# Patient Record
Sex: MALE | Race: WHITE | Employment: FULL TIME | URBAN - METROPOLITAN AREA
[De-identification: names, ages, dates, MRNs, and addresses within clinical notes are randomized per-mention and may not be internally consistent; named-entity substitution may affect disease eponyms.]

---

## 2017-12-08 ENCOUNTER — OFFICE VISIT (OUTPATIENT)
Dept: NEUROLOGY | Age: 67
End: 2017-12-08

## 2017-12-08 VITALS
SYSTOLIC BLOOD PRESSURE: 120 MMHG | RESPIRATION RATE: 12 BRPM | TEMPERATURE: 98.8 F | HEART RATE: 76 BPM | WEIGHT: 165.7 LBS | HEIGHT: 71 IN | BODY MASS INDEX: 23.2 KG/M2 | OXYGEN SATURATION: 98 % | DIASTOLIC BLOOD PRESSURE: 80 MMHG

## 2017-12-08 DIAGNOSIS — R47.89 WORD FINDING DIFFICULTY: ICD-10-CM

## 2017-12-08 DIAGNOSIS — R41.89 COGNITIVE DECLINE: ICD-10-CM

## 2017-12-08 DIAGNOSIS — R41.3 MEMORY LOSS: Primary | ICD-10-CM

## 2017-12-08 DIAGNOSIS — I65.23 BILATERAL CAROTID ARTERY STENOSIS: Primary | ICD-10-CM

## 2017-12-08 DIAGNOSIS — R41.3 MEMORY LOSS: ICD-10-CM

## 2017-12-08 NOTE — MR AVS SNAPSHOT
Visit Information Date & Time Provider Department Dept. Phone Encounter #  
 12/8/2017 11:00 AM MD Radha Watsont [facility] [phone] [id number] Follow-up Instructions Return for After tests. Your Appointments 12/8/2017 11:00 AM  
New Patient with Solomon Costello MD  
[facility]) Appt Note: Memory Loss, Confusion ref dr. alexandrea sloan chloe 11-1-17; r/s, memory loss // 11/17 bw; Memory Loss, Confusion seb 11-20-17  
 [facility] [address] [facility] 99 [id number] [phone]  
  
   
 [place] [facility] [address] [id number] [address] Upcoming Health Maintenance Date Due Hepatitis C Screening 1950 DTaP/Tdap/Td series (1 - Tdap) 7/14/1971 FOBT Q 1 YEAR AGE 50-75 7/14/2000 ZOSTER VACCINE AGE 60> 5/14/2010 GLAUCOMA SCREENING Q2Y 7/14/2015 Pneumococcal 65+ Low/Medium Risk (1 of 2 - PCV13) 7/14/2015 MEDICARE YEARLY EXAM 7/14/2015 Influenza Age 5 to Adult 12/22/2017* *Topic was postponed. The date shown is not the original due date. Allergies as of 12/8/2017  Review Complete On: 12/8/2017 By: Solomon Costello MD  
  
 Severity Noted Reaction Type Reactions Sulfa (Sulfonamide Antibiotics)  01/19/2012   Side Effect Nausea and Vomiting Current Immunizations  Never Reviewed No immunizations on file. Not reviewed this visit You Were Diagnosed With   
  
 Codes Comments Memory loss    -  Primary ICD-10-CM: R41.3 ICD-9-CM: 780.93 Cognitive decline     ICD-10-CM: R41.89 ICD-9-CM: 294.9 Word finding difficulty     ICD-10-CM: R47.89 ICD-9-CM: V40.1 Vitals BP Pulse Temp Resp Height(growth percentile) Weight(growth percentile) 120/80 (BP 1 Location: Left arm, BP Patient Position: Sitting) 76 98.8 °F (37.1 °C) (Oral) 12 5' 11\" (1.803 m) 165 lb 11.2 oz (75.2 kg) SpO2 BMI Smoking Status 98% 23.11 kg/m2 Never Smoker Vitals History BMI and BSA Data Body Mass Index Body Surface Area  
 23.11 kg/m 2 1.94 m 2 Your Updated Medication List  
  
Notice  As of 12/8/2017 10:35 AM  
 You have not been prescribed any medications. We Performed the Following REFERRAL TO NEUROPSYCHOLOGY [DEK93 Custom] Comments:  
 Memory loss dementia vs other T4, FREE [id number] CPT(R)] TSH 3RD GENERATION [88106 CPT(R)] VITAMIN B12 [id number] CPT(R)] Follow-up Instructions Return for After tests. To-Do List   
 12/08/2017 Imaging:  DUPLEX CAROTID BILATERAL AMB NEURO   
  
 12/08/2017 Neurology:  EEG   
  
 12/08/2017 Imaging:  MRI BRAIN WO CONT Referral Information Referral ID Referred By Referred To  
  
 [id number] Swati VALLES [address] [facility] [place] [address] [facility], [address] [facility] Phone: [phone] Fax: [phone] Visits Status Start Date End Date 1 New Request 12/8/17 12/8/18 If your referral has a status of pending review or denied, additional information will be sent to support the outcome of this decision. Patient Instructions Elvis Case [id number] What is a living will? A living will is a legal form you use to write down the kind of care you want at the end of your life. It is used by the health professionals who will treat you if you aren't able to decide for yourself. If you put your wishes in writing, your loved ones and others will know what kind of care you want. They won't need to guess. This can ease your mind and be helpful to others. A living will is not the same as an estate or property will. An estate will explains what you want to happen with your money and property after you die. Is a living will a legal document? A living will is a legal document.  Each state has its own laws about living rodriguez. If you move to another state, make sure that your living will is legal in the state where you now live. Or you might use a universal form that has been approved by many states. This kind of form can sometimes be completed and stored online. Your electronic copy will then be available wherever you have a connection to the Internet. In most cases, doctors will respect your wishes even if you have a form from a different state. · You don't need an  to complete a living will. But legal advice can be helpful if your state's laws are unclear, your health history is complicated, or your family can't agree on what should be in your living will. · You can change your living will at any time. Some people find that their wishes about end-of-life care change as their health changes. · In addition to making a living will, think about completing a medical power of  form. This form lets you name the person you want to make end-of-life treatment decisions for you (your \"health care agent\") if you're not able to. Many hospitals and nursing homes will give you the forms you need to complete a living will and a medical power of . · Your living will is used only if you can't make or communicate decisions for yourself anymore. If you become able to make decisions again, you can accept or refuse any treatment, no matter what you wrote in your living will. · Your state may offer an online registry. This is a place where you can store your living will online so the doctors and nurses who need to treat you can find it right away. What should you think about when creating a living will? Talk about your end-of-life wishes with your family members and your doctor. Let them know what you want. That way the people making decisions for you won't be surprised by your choices. Think about these questions as you make your living will: · Do you know enough about life support methods that might be used? If not, talk to your doctor so you know what might be done if you can't breathe on your own, your heart stops, or you're unable to swallow. · What things would you still want to be able to do after you receive life-support methods? Would you want to be able to walk? To speak? To eat on your own? To live without the help of machines? · If you have a choice, where do you want to be cared for? In your home? At a hospital or nursing home? · Do you want certain Confucianist practices performed if you become very ill? · If you have a choice at the end of your life, where would you prefer to die? At home? In a hospital or nursing home? Somewhere else? · Would you prefer to be buried or cremated? · Do you want your organs to be donated after you die? What should you do with your living will? · Make sure that your family members and your health care agent have copies of your living will. · Give your doctor a copy of your living will to keep in your medical record. If you have more than one doctor, make sure that each one has a copy. · You may want to put a copy of your living will where it can be easily found. Where can you learn more? Go to http://flor-selma.info/. Enter V432 in the search box to learn more about \"Learning About Living Sunday. \" Current as of: September 24, 2016 Content Version: 11.4 © 7211-1750 Inn[employer]. Care instructions adapted under license by [employer] (which disclaims liability or warranty for this information). If you have questions about a medical condition or this instruction, always ask your healthcare professional. [place] [address] any warranty or liability for your use of this information. Information Regarding Testing If you have physican order for a test or a medication denied by your insurance company, this does not mean the test or medication is not appropriate for you as that is a medical decision, not a decision to be made by an insurance company representative or by an [address] [place] [address] who has not interviewed and examined you. This is a decision to be made between you and your physician. The denial of services is a contractual matter between you and your insurance company, not an issue between your physician and the insurance company. If your test or medication is denied, you can take the following steps to help resolve the issue: 1. File a complaint with the [facility] of Insurance regarding your insurance company's denial of services ordered for you. You can do this either by calling them directly or by completing an on-line complaint form on the [employer]. This can be found at [email or web address].[email or web address] 2. Also file a formal complaint with your insurance company and ask to have the name of the person denying the service so that you may explore a legal option should you be harmed by this denial of service. Again, the fact the insurance company will not pay for the service does not mean it is not medically necessary and I would encourage you to follow through with the plan that was made with your physician 3. File a written complaint with your employer so your employer and benefit manager is aware of the poor coverage they are providing their employees. If you have medicare/medicaid, complain to your representative in the House and to your Bouchra Leone. If we have ordered testing for you, we do not call patients with results and we do not give test results over the phone. We schedule follow up appointments so that your results can be discussed in person and any questions you have regarding them may be addressed.   If something of concern is revealed on your test, we will call you for a sooner follow up appointment. Additionally, results may be found by using the My Chart feature and one of our patient service representatives at the  can give you instructions on how to access this feature of our electronic medical record system. PRESCRIPTION REFILL POLICY [facility] Statement to Patients April 1, 2014 In an effort to ensure the large volume of patient prescription refills is processed in the most efficient and expeditious manner, we are asking our patients to assist us by calling your Pharmacy for all prescription refills, this will include also your  Mail Order Pharmacy. The pharmacy will contact our office electronically to continue the refill process. Please do not wait until the last minute to call your pharmacy. We need at least 48 hours (2days) to fill prescriptions. We also encourage you to call your pharmacy before going to  your prescription to make sure it is ready. With regard to controlled substance prescription refill requests (narcotic refills) that need to be picked up at our office, we ask your cooperation by providing us with at least 72 hours (3days) notice that you will need a refill. We will not refill narcotic prescription refill requests after 4:00pm on any weekday, Monday through Thursday, or after 2:00pm on Fridays, or on the weekends. We encourage everyone to explore another way of getting your prescription refill request processed using ReplyBuy, our patient web portal through our electronic medical record system. ReplyBuy is an efficient and effective way to communicate your medication request directly to the office and  downloadable as an rhett on your smart phone . [employer]Buy also features a review functionality that allows you to view your medication list as well as leave messages for your physician. Are you ready to get connected?  If so please review the attatched instructions or speak to any of our staff to get you set up right away! Thank you so much for your cooperation. Should you have any questions please contact our Practice Administrator. The Physicians and Staff,  [facility] Introducing [facility] SERVICES! [facility] introduces Fancy Hands patient portal. Now you can access parts of your medical record, email your doctor's office, and request medication refills online. 1. In your internet browser, go to [email or web address] 2. Click on the First Time User? Click Here link in the Sign In box. You will see the New Member Sign Up page. 3. Enter your Fancy Hands Access Code exactly as it appears below. You will not need to use this code after youve completed the sign-up process. If you do not sign up before the expiration date, you must request a new code. · Fancy Hands Access Code: [email or web address] Expires: 3/8/2018 10:33 AM 
 
4. Enter the last four digits of your Social Security Number (xxxx) and Date of Birth (mm/dd/yyyy) as indicated and click Submit. You will be taken to the next sign-up page. 5. Create a Fancy Hands ID. This will be your Fancy Hands login ID and cannot be changed, so think of one that is secure and easy to remember. 6. Create a Fancy Hands password. You can change your password at any time. 7. Enter your Password Reset Question and Answer. This can be used at a later time if you forget your password. 8. Enter your e-mail address. You will receive e-mail notification when new information is available in [address]. 9. Click Sign Up. You can now view and download portions of your medical record. 10. Click the Download Summary menu link to download a portable copy of your medical information. If you have questions, please visit the Frequently Asked Questions section of the Fancy Hands website. Remember, Fancy Hands is NOT to be used for urgent needs. For medical emergencies, dial 911. Now available from your iPhone and Android! Please provide this summary of care documentation to your next provider. Your primary care clinician is listed as Kathia Cruz. If you have any questions after today's visit, please call [phone].

## 2017-12-08 NOTE — PATIENT INSTRUCTIONS
Learning About Living Sunday  What is a living will? A living will is a legal form you use to write down the kind of care you want at the end of your life. It is used by the health professionals who will treat you if you aren't able to decide for yourself. If you put your wishes in writing, your loved ones and others will know what kind of care you want. They won't need to guess. This can ease your mind and be helpful to others. A living will is not the same as an estate or property will. An estate will explains what you want to happen with your money and property after you die. Is a living will a legal document? A living will is a legal document. Each state has its own laws about living rodriguez. If you move to another state, make sure that your living will is legal in the state where you now live. Or you might use a universal form that has been approved by many states. This kind of form can sometimes be completed and stored online. Your electronic copy will then be available wherever you have a connection to the Internet. In most cases, doctors will respect your wishes even if you have a form from a different state. · You don't need an  to complete a living will. But legal advice can be helpful if your state's laws are unclear, your health history is complicated, or your family can't agree on what should be in your living will. · You can change your living will at any time. Some people find that their wishes about end-of-life care change as their health changes. · In addition to making a living will, think about completing a medical power of  form. This form lets you name the person you want to make end-of-life treatment decisions for you (your \"health care agent\") if you're not able to. Many hospitals and nursing homes will give you the forms you need to complete a living will and a medical power of .   · Your living will is used only if you can't make or communicate decisions for yourself anymore. If you become able to make decisions again, you can accept or refuse any treatment, no matter what you wrote in your living will. · Your state may offer an online registry. This is a place where you can store your living will online so the doctors and nurses who need to treat you can find it right away. What should you think about when creating a living will? Talk about your end-of-life wishes with your family members and your doctor. Let them know what you want. That way the people making decisions for you won't be surprised by your choices. Think about these questions as you make your living will:  · Do you know enough about life support methods that might be used? If not, talk to your doctor so you know what might be done if you can't breathe on your own, your heart stops, or you're unable to swallow. · What things would you still want to be able to do after you receive life-support methods? Would you want to be able to walk? To speak? To eat on your own? To live without the help of machines? · If you have a choice, where do you want to be cared for? In your home? At a hospital or nursing home? · Do you want certain Evangelical practices performed if you become very ill? · If you have a choice at the end of your life, where would you prefer to die? At home? In a hospital or nursing home? Somewhere else? · Would you prefer to be buried or cremated? · Do you want your organs to be donated after you die? What should you do with your living will? · Make sure that your family members and your health care agent have copies of your living will. · Give your doctor a copy of your living will to keep in your medical record. If you have more than one doctor, make sure that each one has a copy. · You may want to put a copy of your living will where it can be easily found. Where can you learn more? Go to http://flor-selma.info/.   Enter D133 in the search box to learn more about \"Learning About Living Sunday. \"  Current as of: September 24, 2016  Content Version: 11.4  © 2891-8535 Healthwise, Incorporated. Care instructions adapted under license by NeuroSigma (which disclaims liability or warranty for this information). If you have questions about a medical condition or this instruction, always ask your healthcare professional. [address] any warranty or liability for your use of this information. Information Regarding Testing     If you have physican order for a test or a medication denied by your insurance company, this does not mean the test or medication is not appropriate for you as that is a medical decision, not a decision to be made by an insurance company representative or by an [facility] physician who has not interviewed and examined you. This is a decision to be made between you and your physician. The denial of services is a contractual matter between you and your insurance company, not an issue between your physician and the insurance company. If your test or medication is denied, you can take the following steps to help resolve the issue:    1. File a complaint with the [facility] of Insurance regarding your insurance company's denial of services ordered for you. You can do this either by calling them directly or by completing an on-line complaint form on the [employer]. This can be found at www.[place].gov    2. Also file a formal complaint with your insurance company and ask to have the name of the person denying the service so that you may explore a legal option should you be harmed by this denial of service. Again, the fact the insurance company will not pay for the service does not mean it is not medically necessary and I would encourage you to follow through with the plan that was made with your physician    3.    File a written complaint with your employer so your employer and benefit manager is aware of the poor coverage they are providing their employees. If you have medicare/medicaid, complain to your representative in the House and to your Bouchra Leone. If we have ordered testing for you, we do not call patients with results and we do not give test results over the phone. We schedule follow up appointments so that your results can be discussed in person and any questions you have regarding them may be addressed. If something of concern is revealed on your test, we will call you for a sooner follow up appointment. Additionally, results may be found by using the My Chart feature and one of our patient service representatives at the  can give you instructions on how to access this feature of our electronic medical record system. [facility]   Statement to Patients  April 1, 2014      In an effort to ensure the large volume of patient prescription refills is processed in the most efficient and expeditious manner, we are asking our patients to assist us by calling your Pharmacy for all prescription refills, this will include also your  Mail Order Pharmacy. The pharmacy will contact our office electronically to continue the refill process. Please do not wait until the last minute to call your pharmacy. We need at least 48 hours (2days) to fill prescriptions. We also encourage you to call your pharmacy before going to  your prescription to make sure it is ready. With regard to controlled substance prescription refill requests (narcotic refills) that need to be picked up at our office, we ask your cooperation by providing us with at least 72 hours (3days) notice that you will need a refill. We will not refill narcotic prescription refill requests after 4:00pm on any weekday, Monday through Thursday, or after 2:00pm on Fridays, or on the weekends.       We encourage everyone to explore another way of getting your prescription refill request processed using Shotlst, our patient web portal through our electronic medical record system. Shotlst is an efficient and effective way to communicate your medication request directly to the office and  downloadable as an rhett on your smart phone . Shotlst also features a review functionality that allows you to view your medication list as well as leave messages for your physician. Are you ready to get connected? If so please review the attatched instructions or speak to any of our staff to get you set up right away! Thank you so much for your cooperation. Should you have any questions please contact our Practice Administrator.     The Physicians and Staff,  [facility]

## 2017-12-08 NOTE — PROGRESS NOTES
Chief Complaint   Patient presents with    Memory Loss     x 2 yrs    Altered mental status     x 2 yrs     1. Have you been to the ER, urgent care clinic since your last visit? Hospitalized since your last visit? Was seen in the [facility]  ER for Acute issues and some testing    2. Have you seen or consulted any other health care providers outside of the [address] [place] [address] since your last visit? Include any pap smears or colon screening. No      Pt states that he got lost a few months back on the [address] due to his memory loss.

## 2017-12-08 NOTE — PROGRESS NOTES
[facility]. [address]   [place] [address]   [facility]    [id number]   [phone] Fax             Referring: Surinder Sol MD      Chief Complaint   Patient presents with    Memory Loss     x 2 yrs    Altered mental status     x 3yrs     71-year-old self-described ambidextrous gentleman who presents today accompanied by his brother, Kathye Kawasaki, for evaluation of forgetfulness and memory loss. He tells me that he is having difficulty finding words. He says that he is in the middle of a conversation and cannot recall the appropriate word to say. He says that he gets stuck on words. This is quite bothersome for him because he notes that he used to be a student of language. He notes that this is been progressive. He thinks he does not remember as well as he used to. 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  He notes that he has had some difficulty at work in terms of completing tasks at times but nothing major. He notes that he typically has everything very regimented and is able to follow that as long as the regimen does not change. Brother notes that there was an incident that was quite bothersome and brought them for second neurologic opinion. He previously saw Dr. Stewart Dalton but never had any evaluation. Patient went to the Social Security office on [address] and this is a part of town where he typically does not go. Patient says that he has not been there in over a year. He says that he became confused all the streets look-alike. He pulled into the fire house because he felt that that would be a good place for him to get in asked some questions and get out. Apparently he was escorted home. His brother took him to the [facility] emergency department to make sure there was nothing acute ongoing.   He had a head CT in the blood work evaluation and all that was fine. Discharge papers that he has with him interestingly has a diagnosis of dementia although he has not had a formal diagnosis or even evaluation for such. He has not had any fever chills. He does not take any medicines so that has not changed. No recent injury or fall. No palpitations shortness of breath. Mother had history of dementia working as a  and having symptoms appear in her late 62s. She had the disease for 12 years before her passing. Past Medical History:   Diagnosis Date    Confusion     GERD (gastroesophageal reflux disease)     Hematochezia 1/19/2012    Memory loss     Psychiatric disorder     depression       Past Surgical History:   Procedure Laterality Date    HX OTHER SURGICAL      Right inguinal hernia repair    CT COLONOSCOPY FLX DX W/COLLJ SPEC WHEN PFRMD  1/19/2012         CT EGD TRANSORAL BIOPSY SINGLE/MULTIPLE  1/19/2012                 Allergies   Allergen Reactions    Sulfa (Sulfonamide Antibiotics) Nausea and Vomiting       Social History   Substance Use Topics    Smoking status: Never Smoker    Smokeless tobacco: Never Used    Alcohol use Yes      Comment: socially       Family History   Problem Relation Age of Onset    Alzheimer Mother        Review of Systems  Pertinent positive and negatives are as noted above otherwise comprehensive systems review is negative    Examination  Visit Vitals    /80 (BP 1 Location: Left arm, BP Patient Position: Sitting)    Pulse 76    Temp 98.8 °F (37.1 °C) (Oral)    Resp 12    Ht 5' 11\" (1.803 m)    Wt 75.2 kg (165 lb 11.2 oz)    SpO2 98%    BMI 23.11 kg/m2     Pleasant, well appearing gentleman who is appropriately dressed and groomed. No icterus. Oropharynx clear. Supple neck without bruit. Heart regular. No murmur. No edema. Neurologically he is awake alert oriented to December 8, 2017.   He says the current president is Carmen Schuster and he does recall [address] [place] [address] Obama but needs a hent to do so. He incorrectly identifies the floor we are on. Correctly calculates a number of quarters in $1.75. Registration 3/3 recall 1/3 and 2/3 with hands. He has difficulty with a cryptic phrase and gets frustrated and a bit agitated at times. Follows commands. He is fluent. No right left confusion. Pupils react bilaterally. Full versions. No nystagmus. Face symmetrical.  Tongue and palate midline. He has no pronation or drift. Age-related paratonia throughout. No abnormal movement. He resists fully in the upper and lower extremities in all muscle groups to direct testing. Symmetric reflexes upper and lower extremities bilaterally. No pathologic reflexes. No ataxia. Gait is steady. Sensory intact to primary. Impression/Plan  27-year-old gentleman with progressive cognitive decline differential diagnosis includes mild cognitive impairment versus age-related cognitive decline versus early stage dementia versus processing issue versus attention issue versus pseudodementia versus other. We will proceed with an MRI of the brain EEG carotid Doppler formal neuropsychological testing and also evaluate metabolic abnormalities with B12 and thyroid functioning. Discussed this today with him at length as well as his brother. Follow-up after testing. This note was created using voice recognition software. Despite editing, there may be syntax errors. This note will not be viewable in [address]

## 2017-12-09 LAB
T4 FREE SERPL-MCNC: 1.1 NG/DL (ref 0.82–1.77)
TSH SERPL DL<=0.005 MIU/L-ACNC: 3.2 UIU/ML (ref 0.45–4.5)
VIT B12 SERPL-MCNC: 510 PG/ML (ref 232–1245)

## 2018-01-09 ENCOUNTER — HOSPITAL ENCOUNTER (OUTPATIENT)
Dept: NEUROLOGY | Age: 68
Discharge: HOME | End: 2018-01-09
Attending: PSYCHIATRY & NEUROLOGY
Payer: COMMERCIAL

## 2018-01-09 ENCOUNTER — HOSPITAL ENCOUNTER (OUTPATIENT)
Dept: MRI IMAGING | Age: 68
Discharge: HOME | End: 2018-01-09
Attending: PSYCHIATRY & NEUROLOGY
Payer: COMMERCIAL

## 2018-01-09 DIAGNOSIS — R47.89 WORD FINDING DIFFICULTY: ICD-10-CM

## 2018-01-09 DIAGNOSIS — R41.89 COGNITIVE DECLINE: ICD-10-CM

## 2018-01-09 DIAGNOSIS — R41.3 MEMORY LOSS: ICD-10-CM

## 2018-01-09 PROCEDURE — 95816 EEG AWAKE AND DROWSY: CPT

## 2018-01-09 PROCEDURE — 70551 MRI BRAIN STEM W/O DYE: CPT

## 2018-01-12 ENCOUNTER — TELEPHONE (OUTPATIENT)
Dept: NEUROLOGY | Age: 68
End: 2018-01-12

## 2018-01-12 NOTE — TELEPHONE ENCOUNTER
----- Message from Santos Wilson sent at 1/12/2018  9:31 AM EST -----  Regarding: Dr. Stallings Primer, brother and power of  would like a call back to confirm whether or not Dr. Alan Stapleton has received the most recent test results. Mr. Derek Araiza can be reached at [phone].

## 2018-01-16 NOTE — TELEPHONE ENCOUNTER
Message left, If we have ordered testing for you, we do not call patients with results and we do not give test results over the phone. We schedule follow up appointments so that your results can be discussed in person and any questions you have regarding them may be addressed. If something of concern is revealed on your test, we will call you for a sooner follow up appointment. Additionally, results may be found by using the My Chart feature and one of our patient service representatives at the  can give you instructions on how to access this feature of our electronic medical record system.

## 2018-01-18 ENCOUNTER — TELEPHONE (OUTPATIENT)
Dept: NEUROLOGY | Age: 68
End: 2018-01-18

## 2018-03-22 ENCOUNTER — OFFICE VISIT (OUTPATIENT)
Dept: NEUROLOGY | Age: 68
End: 2018-03-22

## 2018-03-22 VITALS
SYSTOLIC BLOOD PRESSURE: 118 MMHG | TEMPERATURE: 98 F | HEART RATE: 71 BPM | DIASTOLIC BLOOD PRESSURE: 72 MMHG | BODY MASS INDEX: 22.4 KG/M2 | RESPIRATION RATE: 17 BRPM | OXYGEN SATURATION: 92 % | HEIGHT: 71 IN | WEIGHT: 160 LBS

## 2018-03-22 DIAGNOSIS — R41.89 COGNITIVE DECLINE: ICD-10-CM

## 2018-03-22 DIAGNOSIS — R41.3 MEMORY LOSS: Primary | ICD-10-CM

## 2018-03-22 NOTE — PROGRESS NOTES
Follow up for results for memory loss. Patient reports no significant changes in memory since last visit. No acute problems reported.

## 2018-03-22 NOTE — MR AVS SNAPSHOT
Shirley Souzamack 
 
 
 [place] [address] [id number] [phone] Patient: Ajay Leavitt MRN: [id number] Visit Information Date & Time Provider Department Dept. Phone Encounter #  
 3/22/2018 10:40 AM MD Tyler Hobson [facility] [phone] [id number] Follow-up Instructions Return for After Neuropsych tests. Your Appointments 5/21/2018 10:40 AM  
New Patient with Thomas Velasquez PsyD [address] [facility] Appt Note: np memory loss dementia vs other seb 12-8-17  
 [place] [address] [address] [facility] [address] [id number] [phone]  
  
   
 [place] [address] [facility] [address] [address] [address] Upcoming Health Maintenance Date Due Hepatitis C Screening 1950 DTaP/Tdap/Td series (1 - Tdap) 7/14/1971 FOBT Q 1 YEAR AGE 50-75 7/14/2000 ZOSTER VACCINE AGE 60> 5/14/2010 GLAUCOMA SCREENING Q2Y 7/14/2015 Pneumococcal 65+ Low/Medium Risk (1 of 2 - PCV13) 7/14/2015 Influenza Age 5 to Adult 8/1/2017 Allergies as of 3/22/2018  Review Complete On: 3/22/2018 By: Naima Chatterjee LPN Severity Noted Reaction Type Reactions Sulfa (Sulfonamide Antibiotics)  01/19/2012   Side Effect Nausea and Vomiting Current Immunizations  Never Reviewed No immunizations on file. Not reviewed this visit Vitals BP Pulse Temp Resp Height(growth percentile) Weight(growth percentile) 118/72 71 98 °F (36.7 °C) 17 5' 11\" (1.803 m) 160 lb (72.6 kg) SpO2 BMI Smoking Status 92% 22.32 kg/m2 Never Smoker Vitals History BMI and BSA Data Body Mass Index Body Surface Area  
 22.32 kg/m 2 1.91 m 2 Your Updated Medication List  
  
Notice  As of 3/22/2018 11:08 AM  
 You have not been prescribed any medications. Follow-up Instructions Return for After Neuropsych tests. Patient Instructions Information Regarding Testing If you have physican order for a test or a medication denied by your insurance company, this does not mean the test or medication is not appropriate for you as that is a medical decision, not a decision to be made by an insurance company representative or by an [facility] physician who has not interviewed and examined you. This is a decision to be made between you and your physician. The denial of services is a contractual matter between you and your insurance company, not an issue between your physician and the insurance company. If your test or medication is denied, you can take the following steps to help resolve the issue: 1. File a complaint with the [facility] regarding your insurance company's denial of services ordered for you. You can do this either by calling them directly or by completing an on-line complaint form on the [employer]. This can be found at [email or web address] 2. Also file a formal complaint with your insurance company and ask to have the name of the person denying the service so that you may explore a legal option should you be harmed by this denial of service. Again, the fact the insurance company will not pay for the service does not mean it is not medically necessary and I would encourage you to follow through with the plan that was made with your physician 3. File a written complaint with your employer so your employer and benefit manager is aware of the poor coverage they are providing their employees. If you have medicare/medicaid, complain to your representative in the House and to your Bouchra Leone. PRESCRIPTION REFILL POLICY [facility] Statement to Patients April 1, 2014 In an effort to ensure the large volume of patient prescription refills is processed in the most efficient and expeditious manner, we are asking our patients to assist us by calling your Pharmacy for all prescription refills, this will include also your  Mail Order Pharmacy. The pharmacy will contact our office electronically to continue the refill process. Please do not wait until the last minute to call your pharmacy. We need at least 48 hours (2days) to fill prescriptions. We also encourage you to call your pharmacy before going to  your prescription to make sure it is ready. With regard to controlled substance prescription refill requests (narcotic refills) that need to be picked up at our office, we ask your cooperation by providing us with at least 72 hours (3days) notice that you will need a refill. We will not refill narcotic prescription refill requests after 4:00pm on any weekday, Monday through Thursday, or after 2:00pm on Fridays, or on the weekends. We encourage everyone to explore another way of getting your prescription refill request processed using [employer], our patient web portal through our electronic medical record system. [employer] is an efficient and effective way to communicate your medication request directly to the office and  downloadable as an rhett on your smart phone . [employer] also features a review functionality that allows you to view your medication list as well as leave messages for your physician. Are you ready to get connected? If so please review the attatched instructions or speak to any of our staff to get you set up right away! Thank you so much for your cooperation. Should you have any questions please contact our Practice Administrator. The Physicians and Staff,  [facility] If we have ordered testing for you, we do not call patients with results and we do not give test results over the phone.   We schedule follow up appointments so that your results can be discussed in person and any questions you have regarding them may be addressed. If something of concern is revealed on your test, we will call you for a sooner follow up appointment. Additionally, results may be found by using the My Chart feature and one of our patient service representatives at the  can give you instructions on how to access this feature of our electronic medical record system. [address] What is a living will? A living will is a legal form you use to write down the kind of care you want at the end of your life. It is used by the health professionals who will treat you if you aren't able to decide for yourself. If you put your wishes in writing, your loved ones and others will know what kind of care you want. They won't need to guess. This can ease your mind and be helpful to others. A living will is not the same as an estate or property will. An estate will explains what you want to happen with your money and property after you die. Is a living will a legal document? A living will is a legal document. Each state has its own laws about living rodriguez. If you move to another state, make sure that your living will is legal in the state where you now live. Or you might use a universal form that has been approved by many states. This kind of form can sometimes be completed and stored online. Your electronic copy will then be available wherever you have a connection to the Internet. In most cases, doctors will respect your wishes even if you have a form from a different state. · You don't need an  to complete a living will. But legal advice can be helpful if your state's laws are unclear, your health history is complicated, or your family can't agree on what should be in your living will. · You can change your living will at any time. Some people find that their wishes about end-of-life care change as their health changes. · In addition to making a living will, think about completing a medical power of  form. This form lets you name the person you want to make end-of-life treatment decisions for you (your \"health care agent\") if you're not able to. Many hospitals and nursing homes will give you the forms you need to complete a living will and a medical power of . · Your living will is used only if you can't make or communicate decisions for yourself anymore. If you become able to make decisions again, you can accept or refuse any treatment, no matter what you wrote in your living will. · Your state may offer an online registry. This is a place where you can store your living will online so the doctors and nurses who need to treat you can find it right away. What should you think about when creating a living will? Talk about your end-of-life wishes with your family members and your doctor. Let them know what you want. That way the people making decisions for you won't be surprised by your choices. Think about these questions as you make your living will: · Do you know enough about life support methods that might be used? If not, talk to your doctor so you know what might be done if you can't breathe on your own, your heart stops, or you're unable to swallow. · What things would you still want to be able to do after you receive life-support methods? Would you want to be able to walk? To speak? To eat on your own? To live without the help of machines? · If you have a choice, where do you want to be cared for? In your home? At a hospital or nursing home? · Do you want certain Christianity practices performed if you become very ill? · If you have a choice at the end of your life, where would you prefer to die? At home? In a hospital or nursing home? Somewhere else? · Would you prefer to be buried or cremated? · Do you want your organs to be donated after you die? What should you do with your living will? · Make sure that your family members and your health care agent have copies of your living will. · Give your doctor a copy of your living will to keep in your medical record. If you have more than one doctor, make sure that each one has a copy. · You may want to put a copy of your living will where it can be easily found. Where can you learn more? Go to http://flor-selma.info/. Enter V349 in the search box to learn more about \"Learning About Living Sunday. \" Current as of: September 24, 2016 Content Version: 11.4 © 7304-1014 [employer]. Care instructions adapted under license by [employer] (which disclaims liability or warranty for this information). If you have questions about a medical condition or this instruction, always ask your healthcare professional. [address] any warranty or liability for your use of this information. Introducing \[facility] [place] [facility]\"" & HEALTH SERVICES! [employer] introduces [employer] patient portal. Now you can access parts of your medical record, email your doctor's office, and request medication refills online. 1. In your internet browser, go to [email or web address] 2. Click on the First Time User? Click Here link in the Sign In box. You will see the New Member Sign Up page. 3. Enter your [employer] Access Code exactly as it appears below. You will not need to use this code after youve completed the sign-up process. If you do not sign up before the expiration date, you must request a new code. · [employer] Access Code: [email or web address] Expires: 6/20/2018 11:05 AM 
 
4. Enter the last four digits of your Social Security Number (xxxx) and Date of Birth (mm/dd/yyyy) as indicated and click Submit. You will be taken to the next sign-up page. 5. Create a [employer] ID. This will be your [employer] login ID and cannot be changed, so think of one that is secure and easy to remember. 6. Create a [employer] password. You can change your password at any time. 7. Enter your Password Reset Question and Answer. This can be used at a later time if you forget your password. 8. Enter your e-mail address. You will receive e-mail notification when new information is available in [address] 9. Click Sign Up. You can now view and download portions of your medical record. 10. Click the Download Summary menu link to download a portable copy of your medical information. If you have questions, please visit the Frequently Asked Questions section of the [employer] website. Remember, [employer] is NOT to be used for urgent needs. For medical emergencies, dial 911. Now available from your iPhone and Android! Please provide this summary of care documentation to your next provider. Your primary care clinician is listed as Jessica Coker. If you have any questions after today's visit, please call [phone].

## 2018-03-22 NOTE — PATIENT INSTRUCTIONS
Information Regarding Testing     If you have physican order for a test or a medication denied by your insurance company, this does not mean the test or medication is not appropriate for you as that is a medical decision, not a decision to be made by an insurance company representative or by an [facility] physician who has not interviewed and examined you. This is a decision to be made between you and your physician. The denial of services is a contractual matter between you and your insurance company, not an issue between your physician and the insurance company. If your test or medication is denied, you can take the following steps to help resolve the issue:    1. File a complaint with the [facility] regarding your insurance company's denial of services ordered for you. You can do this either by calling them directly or by completing an on-line complaint form on the [employer]. This can be found at [email or web address]    2. Also file a formal complaint with your insurance company and ask to have the name of the person denying the service so that you may explore a legal option should you be harmed by this denial of service. Again, the fact the insurance company will not pay for the service does not mean it is not medically necessary and I would encourage you to follow through with the plan that was made with your physician    3. File a written complaint with your employer so your employer and benefit manager is aware of the poor coverage they are providing their employees. If you have medicare/medicaid, complain to your representative in the House and to your Bouchra Leone.     [facility]   Statement to Patients  April 1, 2014      In an effort to ensure the large volume of patient prescription refills is processed in the most efficient and expeditious manner, we are asking our patients to assist us by calling your Pharmacy for all prescription refills, this will include also your  Mail Order Pharmacy. The pharmacy will contact our office electronically to continue the refill process. Please do not wait until the last minute to call your pharmacy. We need at least 48 hours (2days) to fill prescriptions. We also encourage you to call your pharmacy before going to  your prescription to make sure it is ready. With regard to controlled substance prescription refill requests (narcotic refills) that need to be picked up at our office, we ask your cooperation by providing us with at least 72 hours (3days) notice that you will need a refill. We will not refill narcotic prescription refill requests after 4:00pm on any weekday, Monday through Thursday, or after 2:00pm on Fridays, or on the weekends. We encourage everyone to explore another way of getting your prescription refill request processed using ContactPoint, our patient web portal through our electronic medical record system. ContactPoint is an efficient and effective way to communicate your medication request directly to the office and  downloadable as an rhett on your smart phone . ContactPoint also features a review functionality that allows you to view your medication list as well as leave messages for your physician. Are you ready to get connected? If so please review the attatched instructions or speak to any of our staff to get you set up right away! Thank you so much for your cooperation. Should you have any questions please contact our Practice Administrator. The Physicians and Staff,  [facility]     If we have ordered testing for you, we do not call patients with results and we do not give test results over the phone. We schedule follow up appointments so that your results can be discussed in person and any questions you have regarding them may be addressed.   If something of concern is revealed on your test, we will call you for a sooner follow up appointment. Additionally, results may be found by using the My Chart feature and one of our patient service representatives at the  can give you instructions on how to access this feature of our electronic medical record system. Learning About Living Sunday  What is a living will? A living will is a legal form you use to write down the kind of care you want at the end of your life. It is used by the health professionals who will treat you if you aren't able to decide for yourself. If you put your wishes in writing, your loved ones and others will know what kind of care you want. They won't need to guess. This can ease your mind and be helpful to others. A living will is not the same as an estate or property will. An estate will explains what you want to happen with your money and property after you die. Is a living will a legal document? A living will is a legal document. Each state has its own laws about living rodriguez. If you move to another state, make sure that your living will is legal in the state where you now live. Or you might use a universal form that has been approved by many states. This kind of form can sometimes be completed and stored online. Your electronic copy will then be available wherever you have a connection to the Internet. In most cases, doctors will respect your wishes even if you have a form from a different state. · You don't need an  to complete a living will. But legal advice can be helpful if your state's laws are unclear, your health history is complicated, or your family can't agree on what should be in your living will. · You can change your living will at any time. Some people find that their wishes about end-of-life care change as their health changes. · In addition to making a living will, think about completing a medical power of  form.  This form lets you name the person you want to make end-of-life treatment decisions for you (your \"health care agent\") if you're not able to. Many hospitals and nursing homes will give you the forms you need to complete a living will and a medical power of . · Your living will is used only if you can't make or communicate decisions for yourself anymore. If you become able to make decisions again, you can accept or refuse any treatment, no matter what you wrote in your living will. · Your state may offer an online registry. This is a place where you can store your living will online so the doctors and nurses who need to treat you can find it right away. What should you think about when creating a living will? Talk about your end-of-life wishes with your family members and your doctor. Let them know what you want. That way the people making decisions for you won't be surprised by your choices. Think about these questions as you make your living will:  · Do you know enough about life support methods that might be used? If not, talk to your doctor so you know what might be done if you can't breathe on your own, your heart stops, or you're unable to swallow. · What things would you still want to be able to do after you receive life-support methods? Would you want to be able to walk? To speak? To eat on your own? To live without the help of machines? · If you have a choice, where do you want to be cared for? In your home? At a hospital or nursing home? · Do you want certain [employer] practices performed if you become very ill? · If you have a choice at the end of your life, where would you prefer to die? At home? In a hospital or nursing home? Somewhere else? · Would you prefer to be buried or cremated? · Do you want your organs to be donated after you die? What should you do with your living will? · Make sure that your family members and your health care agent have copies of your living will. · Give your doctor a copy of your living will to keep in your medical record.  If you have more than one doctor, make sure that each one has a copy. · You may want to put a copy of your living will where it can be easily found. Where can you learn more? Go to http://flor-selma.info/. Enter M540 in the search box to learn more about \"Learning About Living Perroy. \"  Current as of: September 24, 2016  Content Version: 11.4  © 3195-1415 [employer]. Care instructions adapted under license by [employer] (which disclaims liability or warranty for this information). If you have questions about a medical condition or this instruction, always ask your healthcare professional. [address] any warranty or liability for your use of this information.

## 2018-03-22 NOTE — PROGRESS NOTES
[facility] [place] [address]   [place] [address]   [place] [address]   [phone] JAMEY   [phone] Fax               Chief Complaint   Patient presents with    Results     follow up memory loss        Allergies   Allergen Reactions    Sulfa (Sulfonamide Antibiotics) Nausea and Vomiting     Social History   Substance Use Topics    Smoking status: Never Smoker    Smokeless tobacco: Never Used    Alcohol use Yes      Comment: socially     Patient returns today coming by his mother for follow-up from his recent initial consultation for complaints of memory difficulty. He has his formal neuropsychological test with Dr. Alexa Conteh upcoming in May. He had an MRI of the brain performed which I reviewed and he has significant atrophy. EEG was unremarkable. Doppler with a 16-49% stenosis. His brother notes that the patient has had some increased anxiety. He questions whether or not Zoloft may help. We discussed the formal neuropsychological testing that is upcoming. Questions and discussion regarding potential diagnoses. Questions and discussion undertaken regarding potential treatments dependent upon those results. We will await the formal neuropsychological evaluation to determine how we should proceed. All questions answered as above. I did get Dr. Alexa Conteh scheduled her to meet with them today to make sure they knew when their appointment was and to make sure they were on the cancellation list and knew how to try to get a sooner appointment    Aislinn Graves MD    Total time: 25 min   Counseling / coordination time: 15 min   > 50% counseling / coordination?: Yes re: as documented above  This note will not be viewable in [address]           Examination  Visit Vitals    /72    Pulse 71    Temp 98 °F (36.7 °C)    Resp 17    Ht 5' 11\" (1.803 m)    Wt 72.6 kg (160 lb)    SpO2 92%    BMI 22.32 kg/m2         Impression/Plan          This note was created using voice recognition software. Despite editing, there may be syntax errors. This note will not be viewable in [address]

## 2018-05-21 ENCOUNTER — OFFICE VISIT (OUTPATIENT)
Dept: NEUROLOGY | Age: 68
End: 2018-05-21

## 2018-05-21 DIAGNOSIS — R41.89 COGNITIVE DECLINE: ICD-10-CM

## 2018-05-21 DIAGNOSIS — G31.84 MILD COGNITIVE IMPAIRMENT: Primary | ICD-10-CM

## 2018-05-21 DIAGNOSIS — R41.3 MEMORY LOSS: ICD-10-CM

## 2018-05-21 DIAGNOSIS — R47.89 WORD FINDING DIFFICULTY: ICD-10-CM

## 2018-05-21 DIAGNOSIS — F41.9 ANXIETY AND DEPRESSION: ICD-10-CM

## 2018-05-21 DIAGNOSIS — F32.A ANXIETY AND DEPRESSION: ICD-10-CM

## 2018-05-21 NOTE — PROGRESS NOTES
[address] [facility]  [address]   [place] [address] [facility] [address] [facility] [facility], [facility] [address]   [phone] Office   [phone] Fax      Neuropsychology    Initial Diagnostic Interview Note      Referral:  William Perez MD, Dr. Ced Beltre. is a 79 y.o. (he was born right handed and left for sports) single  male who was accompanied by his brother to the initial clinical interview on 5/21/18 . Please refer to his medical records for details pertaining to his history. He completed college. No history of previously diagnosed LD and/or receipt of special education services. He works at [employer] and has been working there for at least 7 years. He lives alone. In retrospect, he notes that he has hit his head about 4 times. The last time was 8 weeks or so go. He loses words and word finding problems are noted today. He does not remember what he did previously. The last hit to the head was during the last snow fall. Hard time completing tasks on time  He slipped on black ice flipped and back of head bounced. No LOC. The other head injuries also he did not lose consciousness. First TBI was playing baseball at age 13. He is regimented but sometimes forgets his routines. The memory loss is progressive. He has some hard time completing tasks at work. Brother notes that there was an incident that was quite bothersome and brought them for second neurologic opinion. He previously saw Dr. Gerry Hutchinson but never had any evaluation. From Dr. Layla Wu' noted \"Patient went to the Social Security office on [address] and this is a part of town where he typically does not go. Patient says that he has not been there in over a year. He says that he became confused all the streets look-alike.   He pulled into the fire house because he felt that that would be a good place for him to get in asked some questions and get out. Apparently he was escorted home. His brother took him to the [facility] emergency department to make sure there was nothing acute ongoing. He had a head CT in the blood work evaluation and all that was fine. Discharge papers that he has with him interestingly has a diagnosis of dementia although he has not had a formal diagnosis or even evaluation for such. \"  Barges changes in sense of taste or smell. Has had glasses for over a year. Driving is fine. He is not on any meds. Day-to-day chores are fine. He is very meticulous about the bank account. Goes to the bank every day. Used debit for everything. No acute or focal issues. Aside from TBI history, no other known neurologic concerns. No medication changes. Mother had dementia and her symptoms showed up in her 62s and she had dementia for 12 years before she passed away. He has a history of anxiety. And more recently, he is more anxious. Afraid he is going to lose his job. Son notes that, within the last year or so, there has been progressive memory decline. He can remember things from years ago without issue, but the short term memory is a struggle. He will ask the same question over and over again. He gets frustrated more easily. MRI: Prominent atrophy, mild nonspecific white matter disease. No previous neuropsych.      Neuropsychological Mental Status Exam (NMSE):  Historian: Good  Praxis: No UE apraxia  R/L Orientation: Intact to self and to other  Dress: within normal limits   Weight: within normal limits   Appearance/Hygiene: within normal limits   Gait: within normal limits   Assistive Devices: Glasses  Mood: within normal limits   Affect: within normal limits   Comprehension: within normal limits   Thought Process: within normal limits   Expressive Language:Mild finding problems  Receptive Language: within normal limits   Motor:  No cognitive or motor perseveration  ETOH: Denied  Tobacco: Denied  Illicit: Denied  SI/HI: Denied  Psychosis:  Insight: Within normal limits  Judgment: Within normal limits  Other Psych:      Past Medical History:   Diagnosis Date    Confusion     GERD (gastroesophageal reflux disease)     Hematochezia 1/19/2012    Memory loss     Psychiatric disorder     depression       Past Surgical History:   Procedure Laterality Date    HX OTHER SURGICAL      Right inguinal hernia repair    NE COLONOSCOPY FLX DX W/COLLJ SPEC WHEN PFRMD  1/19/2012         NE EGD TRANSORAL BIOPSY SINGLE/MULTIPLE  1/19/2012            Allergies   Allergen Reactions    Sulfa (Sulfonamide Antibiotics) Nausea and Vomiting       Family History   Problem Relation Age of Onset    Alzheimer Mother        Social History   Substance Use Topics    Smoking status: Never Smoker    Smokeless tobacco: Never Used    Alcohol use Yes      Comment: socially             Plan:  Obtain authorization for testing from [facility]. Report to follow once testing, scoring, and interpretation completed. ? Organic based neurocognitive issues versus mood disorder or combination of same. ? Problems organic, functional, or both? This note will not be viewable in [address] 79year old with progressive memory decline, word finding difficulties, history of concussions, and anxiety/depression. Mother had dementia in her 62s and concern is for MCI, dementia, anxiety. Checks and double checks and triple checks.

## 2018-06-12 ENCOUNTER — OFFICE VISIT (OUTPATIENT)
Dept: NEUROLOGY | Age: 68
End: 2018-06-12

## 2018-06-12 DIAGNOSIS — G30.0 EARLY ONSET ALZHEIMER'S DEMENTIA WITHOUT BEHAVIORAL DISTURBANCE (HCC): Primary | ICD-10-CM

## 2018-06-12 DIAGNOSIS — Z86.59 HISTORY OF ANXIETY: ICD-10-CM

## 2018-06-12 DIAGNOSIS — F43.22 ADJUSTMENT DISORDER WITH ANXIETY: ICD-10-CM

## 2018-06-12 DIAGNOSIS — F02.80 EARLY ONSET ALZHEIMER'S DEMENTIA WITHOUT BEHAVIORAL DISTURBANCE (HCC): Primary | ICD-10-CM

## 2018-06-15 NOTE — PROGRESS NOTES
[address] [facility],5Th [address]  [address]   [place] [address] [facility], [facility] [address]   [phone] Office   [phone] Fax      Neuropsychological Evaluation Report  Referral:  Josep Mcgee MD, Dr. Zuleyma Medina. is a 79 y.o. (he was born right handed and left for sports) single  male who was accompanied by his family member to the initial clinical interview on 5/21/18 . Please refer to his medical records for details pertaining to his history. He completed college. No history of previously diagnosed LD and/or receipt of special education services. He works at [employer] and has been working there for at least 7 years. He lives alone. In retrospect, he notes that he has hit his head about 4 times. The last time was 8 weeks or so go. He loses words and word finding problems are noted today. He does not remember what he did previously. The last hit to the head was during the last snow fall. Hard time completing tasks on time  He slipped on black ice flipped and back of head bounced. No LOC. The other head injuries also he did not lose consciousness. First TBI was playing baseball at age 13. He is regimented but sometimes forgets his routines. The memory loss is progressive. He has some hard time completing tasks at work. Brother notes that there was an incident that was quite bothersome and brought them for second neurologic opinion. He previously saw Dr. Bozena Padilla but never had any evaluation. From Dr. Cassidy Oakes' noted \"Patient went to the Social Security office on [address] and this is a part of town where he typically does not go. Patient says that he has not been there in over a year. He says that he became confused all the streets look-alike. He pulled into the fire house because he felt that that would be a good place for him to get in asked some questions and get out. Apparently he was escorted home. His brother took him to the [facility] emergency department to make sure there was nothing acute ongoing. He had a head CT in the blood work evaluation and all that was fine. Discharge papers that he has with him interestingly has a diagnosis of dementia although he has not had a formal diagnosis or even evaluation for such. \" Cony Cadet changes in sense of taste or smell. Has had glasses for over a year. Driving is fine. He is not on any meds. Day-to-day chores are fine. He is very meticulous about the bank account. Goes to the bank every day. Used debit for everything. No acute or focal issues. Aside from TBI history, no other known neurologic concerns. No medication changes. Mother had dementia and her symptoms showed up in her 62s and she had dementia for 12 years before she passed away. He has a history of anxiety. And more recently, he is more anxious. Afraid he is going to lose his job. Son notes that, within the last year or so, there has been progressive memory decline. He can remember things from years ago without issue, but the short term memory is a struggle. He will ask the same question over and over again. He gets frustrated more easily. MRI: Prominent atrophy, mild nonspecific white matter disease. No previous neuropsych.      Neuropsychological Mental Status Exam (NMSE):  Historian: Good  Praxis: No UE apraxia  R/L Orientation: Intact to self and to other  Dress: within normal limits   Weight: within normal limits   Appearance/Hygiene: within normal limits   Gait: within normal limits   Assistive Devices: Glasses  Mood: within normal limits   Affect: within normal limits   Comprehension: within normal limits   Thought Process: within normal limits   Expressive Language:Mild finding problems  Receptive Language: within normal limits   Motor:  No cognitive or motor perseveration  ETOH: Denied  Tobacco: Denied  Illicit: Denied  SI/HI: Denied  Psychosis:  Insight: Within normal limits  Judgment: Within normal limits  Other Psych:      Past Medical History:   Diagnosis Date    Confusion     GERD (gastroesophageal reflux disease)     Hematochezia 1/19/2012    Memory loss     Psychiatric disorder     depression       Past Surgical History:   Procedure Laterality Date    HX OTHER SURGICAL      Right inguinal hernia repair    SD COLONOSCOPY FLX DX W/COLLJ SPEC WHEN PFRMD  1/19/2012         SD EGD TRANSORAL BIOPSY SINGLE/MULTIPLE  1/19/2012            Allergies   Allergen Reactions    Sulfa (Sulfonamide Antibiotics) Nausea and Vomiting       Family History   Problem Relation Age of Onset    Alzheimer Mother        Social History   Substance Use Topics    Smoking status: Never Smoker    Smokeless tobacco: Never Used    Alcohol use Yes      Comment: socially             Plan:  Obtain authorization for testing from [employer]. Report to follow once testing, scoring, and interpretation completed. ? Organic based neurocognitive issues versus mood disorder or combination of same. ? Problems organic, functional, or both? This note will not be viewable in [address] 79year old with progressive memory decline, word finding difficulties, history of concussions, and anxiety/depression. Mother had dementia in her 62s and concern is for MCI, dementia, anxiety. Checks and double checks and triple checks.      Neuropsychological Test Results  Patient Testing 6/12/18 Report Completed 6/15/18  A Psychometrist Assisted w/ portions of this evaluation while under my direct supervision    The following assessment procedures were performed:      Neuropsychologist Performed, Interpreted, & Reported: Neuropsychological Mental Status Exam, Revised Memory & Behavior Checklist, Mini Mental State Exam, Clock Drawing Test, Test Of Premorbid Functioning, Dameon-Melzack Pain Questionnaire,  History Taking  & Clinical Interview With The Patient, Additional History Taking w/ The Patient's Family Member, Review Of Available Records. Psychometrist Administered & Neuropsychologist Interpreted & Neuropsychologist Reported: Finger Tapping Test, Grooved Pegboard Test, WCST, TOMM, Wechsler Adult Intelligence Scale - IV, Verbal Fluency Tests, Calixto & Calixto - Revised, Trailmaking Test Parts A & B, [place] Verbal Learning Test - 3, Mauri Complex Figure Test, Vick Depression Inventory - II, Vick Anxiety Inventory, Personality Assessment Inventory. Computer Administered & Neuropsychologist Interpreted & Neuropsychologist Reported: Carmen Continuous Performance Test - III      Test Findings:  Test Findings:  Note:  The patients raw data have been compared with currently available norms which include demographic corrections for age, gender, and/or education. Sometimes, the patients scores are compared to demographically similar individuals as close to the patients age, education level, etc., as possible. \"Average\" is viewed as being +/- 1 standard deviation (SD) from the stated mean for a particular test score. \"Low average\" is viewed as being between 1 and 2 SD below the mean, and above average is viewed as being 1 and 2 SD above the mean. Scores falling in the borderline range (between 1-1/2 and 2 SD below the mean) are viewed with particular attention as to whether they are normal or abnormal neurocognitive test scores. Other methods of inference in analyzing the test data are also utilized, including the pattern and range of scores in the profile, bilateral motor functions, and the presence, if any, of pathognomonic signs. Behaviorally, the patient was friendly and cooperative and appeared motivated to perform well during this examination. He did get frustrated during the [employer] and walked out because the test was too difficult.  He did poorly on the forced choice portion of the CVLT-III, prompting the administration of the Test of Memory Malingering, which is considered a pass level performance. Within this context, the results of this evaluation are viewed as a valid reflection of the patients actual neurocognitive and emotional status. His structured word list fluency, as assessed by the FAS Test, was within the mildly impaired range with a T score of 37. Category fluency was within the moderately impaired range with a T score of 29. Confrontation naming ability, as assessed by the Calixto & Calixto - Revised, was within the below average range at 52/60 correct (T = 40). This pattern of performance is indicative of a patient who is at increased risk for day-to-day problems with verbal fluency and confrontation naming was normal.       The patient was administered the Carmen Continuous Performance Test - III, a computer-administered test of sustained attention, and review of the subscales within this instrument revealed mild concerns for inattentiveness without impulsivity. This pattern of performance is indicative of a patient who is at increased risk for day-to-day problems with sustained visual attention/concentration. The patient was administered the Wechsler Adult Intelligence Scale - IV. There was no clinically significant difference between his low average range Working Memory Index score of 89 (23rd %ile) and his low average range Processing Speed Index score of 84 (14th %ile). This pattern of performance is not indicative of a patient who is at increased risk for day-to-day problems with working memory and processing speed. His Verbal Comprehension Index score of 85 (16th %ile) was within the low average range and his Perceptual Reasoning Index score of 82 (12th %ile) was low average. See Appendix I (scanned into media section of this EMR) for full breakdown of IQ test scores. Scores are slightly lower than would be expected based on his performance on a measure assessing premorbid functioning levels. The patient was administered the [place]  - 3 and generated an impaired range and generally flat learning curve over five repeated auditory word list learning trials. An interference trial was within normal limits. Free and cued, short and long delayed recall were all markedly impaired. Recognition recall was impaired. Forced choice recall was mildly impaired, prompting the administration of the TOMM (PASS) as noted above. This pattern of performance is indicative of a patient who is at increased risk for day-to-day problems with auditory learning and/or memory. The patients performance on the copy portion of the Mauri Complex Figure Test was within normal limits. Recall for the complex design was within the severely impaired range after both short and long delays. Recognition recall was impaired. (<1st %ile). This pattern of performance is  indicative of a patient who is at increased risk for day-to-day problems with visual organization and visual delayed memory. Simple timed visual motor sequencing (Trailmaking Test Part A) was within the below average range with a T score of 41. His performance on a similar, but more complex task of timed visual motor sequencing (Trailmaking Test Part B) was within the below average range with a T score of 40 (0 errors). On additional assessment of executive functioning [facility]), the patient became frustrated as the test was too difficult for him to comprehend (deductive reasoning) and he discontinued this test.  Overall, this does not provide strong support for a problem with executive functioning, but he struggles with high level deductive reasoning. Motor speed for finger tapping was normal range bilaterally. Fine motor dexterity was mildly impaired bilaterally.   This is a mixed pattern of performance with respect to motor skills which does not provide strong support for a particularly focal or lateralized brain dysfunction and neurologic correlation is indicated. The patient rated his current level of pain as \"0/5- No Pain\" on the Dameon-Melzack Pain Questionnaire. He reported periodic pain in his feet. His Vick Depression Inventory -II score of 10 was within the minimally depressed range. His Vick Anxiety Inventory score of 7 reflected minimal anxiety. The patient was administered the Personality Assessment Inventory and generated a valid profile for interpretation. Within this context, he is somewhat distant in those relationships that are maintained but the personality profile is otherwise within the normal range. Impressions & Recommendations: This patient generated an abnormal range Neuropsychological Evaluation with respect to neurocognitive functioning. In this regard, impairments are noted for verbal fluency, sustained attention, auditory learning, auditory memory, visual organization, visual memory, and bilateral fine motor dexterity. Concurrently, his confrontation naming, working memory, processing speed, verbal comprehension, perceptual reasoning, executive functioning, and bilateral motor speed were normal.  From an emotional standpoint, he denied clinically significant psychopathology on formal assessment measures of mood. Situational anxiety is reported,however, as he is worried about losing his job because of his cognitive issues. I am not concerned about malingering type issues. In my opinion, this profile is consistent with mild to moderate dementia. Alzheimer's is likely. Anxiety appears functional in etiology. I suggest consideration for medication for memory and attention and supportive psychotherapy for adjustment related anxiety issues. While competent, he needs supervision for medications and finances and should assign a POA if he has not done so already.   Dementia will likely interfere with vocational functioning, but he can do more simple, repetitive tasks with repeated instruction, reminders, and supervision . He will have difficulties multitasking and on reasoning tasks as well. I would like to see him yearly to track progress(ion). Baseline now established. Follow up prn. Clinical correlation is, of course, indicated. I will discuss these findings with the patient when he follows up with me in the near future. A follow up Neuropsychological Evaluation is indicated on a prn basis, especially if there are any cognitive and/or emotional changes. DIAGNOSES:  Mild To Moderate Dementia     Adjustment Disorder w/ Anxiety     The above information is based upon information currently available to me. If there is any additional information of which I am currently unaware, I would be more than happy to review it upon having it made available to me. Thank you for the opportunity to see this interesting individual.     Sincerely,       Nnamdi Heredia. Abdon Francois, EdS        dd  CC:  Vlad Pabon MD, Dr. Soundra Galeazzi        2 units -95842-  1.75 hours. Record review. Review of history provided by patient. Review of collaborative information. Testing by Clinician. Review of raw data. Scoring. Report writing of individual tests administered by Clinician. Integration of individual tests administered by psychometrist (that were previously reported and billed under psychometry code below) with testing by clinician and review of records/history/collaborative information. Case Conceptualization, Report writing. Coordination Of Care. 5 units  -72096 - 4.75 hours. Psychometrist test prep, administration, and scoring under clinician's direct supervision. Clinical interpretation of individual tests administered by psychometrist .  Clinician report of individual tests administered by psychometrist.    1 unit - 67373 -  40 minutes.  Computer testing and scoring and clinician's interpretation of computer-administered test(s)    \"Unit\" is defined by CPT/National Guidelines (31 - 60 minutes). Integral services including scoring of raw data, data interpretation, case conceptualization, report writing etcetera were initiated after the patient finished testing/raw data collected and was completed on the date the report was signed.

## 2018-07-24 ENCOUNTER — OFFICE VISIT (OUTPATIENT)
Dept: NEUROLOGY | Age: 68
End: 2018-07-24

## 2018-07-24 DIAGNOSIS — Z86.59 HISTORY OF ANXIETY: ICD-10-CM

## 2018-07-24 DIAGNOSIS — G30.0 EARLY ONSET ALZHEIMER'S DEMENTIA WITHOUT BEHAVIORAL DISTURBANCE (HCC): Primary | ICD-10-CM

## 2018-07-24 DIAGNOSIS — F02.80 EARLY ONSET ALZHEIMER'S DEMENTIA WITHOUT BEHAVIORAL DISTURBANCE (HCC): Primary | ICD-10-CM

## 2018-07-24 DIAGNOSIS — F43.22 ADJUSTMENT DISORDER WITH ANXIETY: ICD-10-CM

## 2018-07-24 RX ORDER — DONEPEZIL HYDROCHLORIDE 10 MG/1
10 TABLET, FILM COATED ORAL
Qty: 30 TAB | Refills: 1 | Status: SHIPPED | OUTPATIENT
Start: 2018-07-24 | End: 2019-01-22 | Stop reason: SDUPTHER

## 2018-07-24 NOTE — PROGRESS NOTES
Reviewed patients testing today. Also reviewed with Dr. Alejandra Brown to ensure he was okay with initiating medications. Discussed with patient regarding the medication, aricept, and side effects. Also discussed with patients brother. Plan will be to f/u in 4-6 weeks to ensure patient is tolerating medication well.  Aricept 10mg daily sent to Ulises Alicia MD  7/24/2018

## 2018-07-24 NOTE — PROGRESS NOTES
Office feedback session with the patient and family today. I reviewed the results of the recent Neuropsychological Evaluation, including discussing individual tests as well as patient's areas of neurocognitive strength versus weakness. Education was provided regarding my diagnostic impressions, and we discussed treatment plan/options. I also answered numerous questions related to the clinical findings, including discussing various methods to improve cognition and mood. Counseling provided regarding mood and cognition. We talked about he is showing signs of early dementia compounded by anxiety. To see neuro for treatment and counseling for anxiety. Will try and get started on med for memory soon. Will try and get that scheduled. CBT and supportive psychotherapy techniques were utilized. The patient will follow up with the referring provider, and reported being very pleased with the services provided. [facility] minutes with patient and family, record review, coordination of care. Records provided. We discussed: This patient generated an abnormal range Neuropsychological Evaluation with respect to neurocognitive functioning. In this regard, impairments are noted for verbal fluency, sustained attention, auditory learning, auditory memory, visual organization, visual memory, and bilateral fine motor dexterity. Concurrently, his confrontation naming, working memory, processing speed, verbal comprehension, perceptual reasoning, executive functioning, and bilateral motor speed were normal.  From an emotional standpoint, he denied clinically significant psychopathology on formal assessment measures of mood. Situational anxiety is reported,however, as he is worried about losing his job because of his cognitive issues. I am not concerned about malingering type issues.                           In my opinion, this profile is consistent with mild to moderate dementia. Alzheimer's is likely. Anxiety appears functional in etiology. I suggest consideration for medication for memory and attention and supportive psychotherapy for adjustment related anxiety issues. While competent, he needs supervision for medications and finances and should assign a POA if he has not done so already. Dementia will likely interfere with vocational functioning, but he can do more simple, repetitive tasks with repeated instruction, reminders, and supervision . He will have difficulties multitasking and on reasoning tasks as well. I would like to see him yearly to track progress(ion). Baseline now established. Follow up prn. Clinical correlation is, of course, indicated.                              I will discuss these findings with the patient when he follows up with me in the near future.   A follow up Neuropsychological Evaluation is indicated on a prn basis, especially if there are any cognitive and/or emotional changes.       DIAGNOSES:                                  Mild To Moderate Dementia                                                                    Adjustment Disorder w/ Anxiety

## 2018-09-04 ENCOUNTER — DOCUMENTATION ONLY (OUTPATIENT)
Dept: NEUROLOGY | Age: 68
End: 2018-09-04

## 2018-09-04 ENCOUNTER — OFFICE VISIT (OUTPATIENT)
Dept: NEUROLOGY | Age: 68
End: 2018-09-04

## 2018-09-04 VITALS
OXYGEN SATURATION: 98 % | BODY MASS INDEX: 22.4 KG/M2 | HEIGHT: 71 IN | HEART RATE: 53 BPM | SYSTOLIC BLOOD PRESSURE: 122 MMHG | DIASTOLIC BLOOD PRESSURE: 72 MMHG | WEIGHT: 160 LBS

## 2018-09-04 DIAGNOSIS — F41.8 SITUATIONAL ANXIETY: ICD-10-CM

## 2018-09-04 DIAGNOSIS — F02.80 LATE ONSET ALZHEIMER'S DISEASE WITHOUT BEHAVIORAL DISTURBANCE (HCC): Primary | ICD-10-CM

## 2018-09-04 DIAGNOSIS — G30.1 LATE ONSET ALZHEIMER'S DISEASE WITHOUT BEHAVIORAL DISTURBANCE (HCC): Primary | ICD-10-CM

## 2018-09-04 RX ORDER — SERTRALINE HYDROCHLORIDE 50 MG/1
50 TABLET, FILM COATED ORAL
Qty: 30 TAB | Refills: 0 | Status: SHIPPED | OUTPATIENT
Start: 2018-09-04 | End: 2018-10-22 | Stop reason: SDUPTHER

## 2018-09-04 NOTE — MR AVS SNAPSHOT
[address] [address] [facility] [address] 
[phone] Patient: Vida Ferguson. MRN: [id number] [facility]:[id number]/39/8347 Visit Information Date & Time Provider Department Dept. Phone Encounter #  
 9/4/2018  1:40 PM Elena Brito MD [facility] [phone] [id number] Follow-up Instructions Return in about 4 weeks (around 10/2/2018). Your Appointments 10/2/2018 10:20 AM  
Follow Up with Rey Almaraz MD  
[address] [facility] [address]) Appt Note: follow up  
 [address] [facility] [address] [address]  
[facility] 57 [address] [facility] [address] Upcoming Health Maintenance Date Due Hepatitis C Screening 1950 DTaP/Tdap/Td series (1 - Tdap) 7/14/1971 FOBT Q 1 YEAR AGE 50-75 7/14/2000 ZOSTER VACCINE AGE 60> 5/14/2010 GLAUCOMA SCREENING Q2Y 7/14/2015 Pneumococcal 65+ Low/Medium Risk (1 of 2 - PCV13) 7/14/2015 Influenza Age 5 to Adult 8/1/2018 MEDICARE YEARLY EXAM 8/30/2018 Allergies as of 9/4/2018  Review Complete On: 9/4/2018 By: Steffi Weathers LPN Severity Noted Reaction Type Reactions Sulfa (Sulfonamide Antibiotics)  01/19/2012   Side Effect Nausea and Vomiting Current Immunizations  Never Reviewed No immunizations on file. Not reviewed this visit You Were Diagnosed With   
  
 Codes Comments Late onset Alzheimer's disease without behavioral disturbance    -  Primary ICD-10-CM: G30.1, F02.80 ICD-9-CM: 331.0, 294.10 Situational anxiety     ICD-10-CM: F41.8 ICD-9-CM: 300.09 Vitals BP Pulse Height(growth percentile) Weight(growth percentile) SpO2 BMI  
 122/72 (!) 53 5' 11\" (1.803 m) 160 lb (72.6 kg) 98% 22.32 kg/m2 Smoking Status Never Smoker BMI and BSA Data Body Mass Index Body Surface Area 22.32 kg/m 2 1.91 m 2 Preferred Pharmacy Pharmacy Name Phone [address] [facility], [address] [place] [address] [phone] Your Updated Medication List  
  
   
This list is accurate as of 9/4/18  2:03 PM.  Always use your most recent med list.  
  
  
  
  
 donepezil 10 mg tablet Commonly known as:  ARICEPT Take 1 Tab by mouth nightly. sertraline 50 mg tablet Commonly known as:  ZOLOFT Take 1 Tab by mouth every morning. For anxiety Prescriptions Sent to Pharmacy Refills  
 sertraline (ZOLOFT) 50 mg tablet 0 Sig: Take 1 Tab by mouth every morning. For anxiety Class: Normal  
 Pharmacy: [address] [place] [address] #: [phone] Route: Oral  
  
Follow-up Instructions Return in about 4 weeks (around 10/2/2018). Patient Instructions 1. Take Aricept (donepezil 10 mg) every night. This is your memory medication 2. Add Zoloft (sertraline 50 mg) every morning. This is for your anxiety 3. Follow up with Dr Devin Leong (or one of our Neurology Nurse Practitioners) in 4 weeks to reassess your anxiety response and see if you're having less difficulty with concentration or finding words. 4. At that follow up visit, they may discuss starting a 2nd memory medication to help your memory Introducing [facility] [place] [facility] SERVICES! Jamia León introduces [employer] patient portal. Now you can access parts of your medical record, email your doctor's office, and request medication refills online. 1. In your internet browser, go to [email or web address] 2. Click on the First Time User? Click Here link in the Sign In box. You will see the New Member Sign Up page. 3. Enter your [employer] Access Code exactly as it appears below. You will not need to use this code after youve completed the sign-up process. If you do not sign up before the expiration date, you must request a new code. · [employer] Access Code: [email or web address] Expires: 12/3/2018  1:56 PM 
 
4. Enter the last four digits of your Social Security Number (xxxx) and Date of Birth (mm/dd/yyyy) as indicated and click Submit. You will be taken to the next sign-up page. 5. Create a [employer] ID. This will be your [employer] login ID and cannot be changed, so think of one that is secure and easy to remember. 6. Create a [employer] password. You can change your password at any time. 7. Enter your Password Reset Question and Answer. This can be used at a later time if you forget your password. 8. Enter your e-mail address. You will receive e-mail notification when new information is available in [address] 9. Click Sign Up. You can now view and download portions of your medical record. 10. Click the Download Summary menu link to download a portable copy of your medical information. If you have questions, please visit the Frequently Asked Questions section of the [employer] website. Remember, [employer] is NOT to be used for urgent needs. For medical emergencies, dial 911. Now available from your iPhone and Android! Please provide this summary of care documentation to your next provider. Your primary care clinician is listed as Teja Rock. If you have any questions after today's visit, please call [phone].

## 2018-09-04 NOTE — PROGRESS NOTES
Follow up for memory issues. Prescribed Aricept 10 mg nightly but patient states he has not been diligent about taking it.

## 2018-09-04 NOTE — PROGRESS NOTES
Interval HPI:   This is a 76 y.o. male who is following up for     Chief Complaint   Patient presents with    Memory Loss     Patient of Dr Cortney Noble who I'm asked to see for follow up as Dr Cortney Noble is out of the office    Pt had Neuropsych testing by Dr Sharon Michaels in late July 2018 and reviewed that report. \"In my opinion, this profile is consistent with mild to moderate dementia. Alzheimer's is likely. Anxiety appears functional in etiology. I suggest consideration for medication for memory and attention and supportive psychotherapy for adjustment related anxiety issues. While competent, he needs supervision for medications and finances and should assign a POA if he has not done so already. Dementia will likely interfere with vocational functioning, but he can do more simple, repetitive tasks with repeated instruction, reminders, and supervision . He will have difficulties multitasking and on reasoning tasks as well. I would like to see him yearly to track progress(ion). Baseline now established. Follow up prn. Clinical correlation is, of course, indicated. \"    Brother accompanies him to the visit. They have a copy of the Neuropsych report and we reviewed the summary (mild-moderate dementia and situational anxiety). Brother is already POA. Pt continues to describe difficulty with short term memory (has trouble remember people's orders) and also having word-finding difficulty, which he says frustrates him. Continues to do all ADLs for himself. He still manages his bills but brother says pt is worried about missing a payment and in general his balance so she goes to bank to get a print out almost daily. He continues to drive to/ from locally without getting lost.  No report of agitation. Pt denies depression and says he feels mildly anxious all the time, but it can be worse depending on the situation. Pt admits to irregularly taking the Donepezil 10 mg QHS as he wasn't sure why he was taking it.  Says he will take it daily from now on. Brief ROS: as noted above    Past Medical History:   Diagnosis Date    Confusion     GERD (gastroesophageal reflux disease)     Hematochezia 1/19/2012    Memory loss     Psychiatric disorder     depression       Allergies   Allergen Reactions    Sulfa (Sulfonamide Antibiotics) Nausea and Vomiting     Current Outpatient Prescriptions   Medication Sig Dispense Refill    sertraline (ZOLOFT) 50 mg tablet Take 1 Tab by mouth every morning. For anxiety 30 Tab 0    donepezil (ARICEPT) 10 mg tablet Take 1 Tab by mouth nightly. 30 Tab 1       Physical Exam  Blood pressure 122/72, pulse (!) 53, height 5' 11\" (1.803 m), weight 72.6 kg (160 lb), SpO2 98 %. No acute distress  Neck: no stiffness  Extremities: no edema    Mental status:   Alert and oriented to person, situation. Oriented to September. Struggles to tell me what year it is. He reports the season as Fall though it's > 80 degrees outside, still summer. He's not sure what town / city this office is in, though brother says they had discussed it prior to today's visit. CNs:  EOM: intact/ conjugate in all directions  Facial strength: symmetric  Facial sensation: intact on both sides  Hearing: normal to conversation     Sensory: intact light touch  Motor: Normal bulk and strength in all 4 extremities. Reflexes: deferred  Gait: normal    Impression      ICD-10-CM ICD-9-CM    1. Late onset Alzheimer's disease without behavioral disturbance G30.1 331.0 REFERRAL TO PSYCHOLOGY    F02.80 294.10    2. Situational anxiety F41.8 300.09 sertraline (ZOLOFT) 50 mg tablet      REFERRAL TO PSYCHOLOGY       Reviewed diagnosis with pt/ brother. Explained to pt that memory medication is meant to slow the cognitive decline, keep him functional for as long as possible. Pt is very agreeable to taking the Aricept 10 mg QHS daily now. Pt appears mild to moderately anxious.   Discussed trying anti-depressant that could help reduce the anxiety and make him feel more focused. He wants to try so sent Rx Zoloft 50 mg QAM to his pharmacy. Encouraged him to continue to be physically and socially active (goes to [facility]). Referred to Dr Elia Martinez Psychology for supportive psychotherapy as suggested in Neuropsych report. F/u with Dr Jolanta Conde in 4 weeks.

## 2019-01-22 RX ORDER — DONEPEZIL HYDROCHLORIDE 10 MG/1
TABLET, FILM COATED ORAL
Qty: 30 TAB | Refills: 0 | Status: SHIPPED | OUTPATIENT
Start: 2019-01-22 | End: 2019-05-30 | Stop reason: SDUPTHER

## 2019-01-24 DIAGNOSIS — F41.8 SITUATIONAL ANXIETY: ICD-10-CM

## 2019-01-24 RX ORDER — SERTRALINE HYDROCHLORIDE 50 MG/1
TABLET, FILM COATED ORAL
Qty: 30 TAB | Refills: 0 | Status: SHIPPED | OUTPATIENT
Start: 2019-01-24 | End: 2019-05-30 | Stop reason: SDUPTHER

## 2019-05-30 DIAGNOSIS — F02.80 LATE ONSET ALZHEIMER'S DISEASE WITHOUT BEHAVIORAL DISTURBANCE (HCC): Primary | ICD-10-CM

## 2019-05-30 DIAGNOSIS — G30.1 LATE ONSET ALZHEIMER'S DISEASE WITHOUT BEHAVIORAL DISTURBANCE (HCC): Primary | ICD-10-CM

## 2019-05-30 DIAGNOSIS — F41.8 SITUATIONAL ANXIETY: ICD-10-CM

## 2019-05-30 RX ORDER — DONEPEZIL HYDROCHLORIDE 10 MG/1
10 TABLET, FILM COATED ORAL
Qty: 30 TAB | Refills: 1 | Status: SHIPPED | OUTPATIENT
Start: 2019-05-30

## 2019-05-30 RX ORDER — SERTRALINE HYDROCHLORIDE 50 MG/1
TABLET, FILM COATED ORAL
Qty: 30 TAB | Refills: 1 | Status: SHIPPED | OUTPATIENT
Start: 2019-05-30

## 2019-05-30 NOTE — TELEPHONE ENCOUNTER
NOV with Dr. Banegas on 7/23/19. Former Dr. Benjamin Campbell pt.   Okay to fill donepezil and sertraline to last until appt per VO Dr. Banegas

## 2019-12-23 ENCOUNTER — APPOINTMENT (OUTPATIENT)
Dept: GENERAL RADIOLOGY | Age: 69
End: 2019-12-23
Attending: EMERGENCY MEDICINE
Payer: COMMERCIAL

## 2019-12-23 ENCOUNTER — HOSPITAL ENCOUNTER (EMERGENCY)
Age: 69
Discharge: HOME | End: 2019-12-23
Attending: EMERGENCY MEDICINE
Payer: COMMERCIAL

## 2019-12-23 ENCOUNTER — APPOINTMENT (OUTPATIENT)
Dept: CT IMAGING | Age: 69
End: 2019-12-23
Attending: EMERGENCY MEDICINE
Payer: COMMERCIAL

## 2019-12-23 VITALS
SYSTOLIC BLOOD PRESSURE: 135 MMHG | HEART RATE: 83 BPM | OXYGEN SATURATION: 99 % | TEMPERATURE: 97.7 F | DIASTOLIC BLOOD PRESSURE: 82 MMHG | RESPIRATION RATE: 16 BRPM
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
## 2019-12-23 DIAGNOSIS — S62.522B OPEN DISPLACED FRACTURE OF DISTAL PHALANX OF LEFT THUMB, INITIAL ENCOUNTER: Primary | ICD-10-CM

## 2019-12-23 DIAGNOSIS — S00.31XA ABRASION OF NOSE, INITIAL ENCOUNTER: ICD-10-CM

## 2019-12-23 PROCEDURE — 75810000053 HC SPLINT APPLICATION

## 2019-12-23 PROCEDURE — 90471 IMMUNIZATION ADMIN: CPT

## 2019-12-23 PROCEDURE — 99284 EMERGENCY DEPT VISIT MOD MDM: CPT

## 2019-12-23 PROCEDURE — 70450 CT HEAD/BRAIN W/O DYE: CPT

## 2019-12-23 PROCEDURE — 77030013479 HC CUF TRNQ COT DGT MARM -A

## 2019-12-23 PROCEDURE — 74011000250 HC RX REV CODE- 250: Performed by: EMERGENCY MEDICINE

## 2019-12-23 PROCEDURE — 77030018836 HC SOL IRR NACL ICUM -A

## 2019-12-23 PROCEDURE — 73130 X-RAY EXAM OF HAND: CPT

## 2019-12-23 PROCEDURE — 74011250636 HC RX REV CODE- 250/636: Performed by: EMERGENCY MEDICINE

## 2019-12-23 PROCEDURE — 74011250637 HC RX REV CODE- 250/637: Performed by: EMERGENCY MEDICINE

## 2019-12-23 PROCEDURE — 75810000293 HC SIMP/SUPERF WND  RPR

## 2019-12-23 PROCEDURE — 77030002986 HC SUT PROL J&J -A

## 2019-12-23 PROCEDURE — 90715 TDAP VACCINE 7 YRS/> IM: CPT | Performed by: EMERGENCY MEDICINE

## 2019-12-23 RX ORDER — NAPROXEN 500 MG/1
500 TABLET ORAL 2 TIMES DAILY WITH MEALS
Qty: 20 TAB | Refills: 0 | Status: SHIPPED | OUTPATIENT
Start: 2019-12-23 | End: 2020-01-02

## 2019-12-23 RX ORDER — CEPHALEXIN 250 MG/1
500 CAPSULE ORAL
Status: COMPLETED | OUTPATIENT
Start: 2019-12-23 | End: 2019-12-23

## 2019-12-23 RX ORDER — CEPHALEXIN 500 MG/1
500 CAPSULE ORAL 3 TIMES DAILY
Qty: 21 CAP | Refills: 0 | Status: SHIPPED | OUTPATIENT
Start: 2019-12-23 | End: 2019-12-30

## 2019-12-23 RX ORDER — LIDOCAINE HYDROCHLORIDE 10 MG/ML
10 INJECTION INFILTRATION; PERINEURAL
Status: COMPLETED | OUTPATIENT
Start: 2019-12-23 | End: 2019-12-23

## 2019-12-23 RX ORDER — BACITRACIN 500 UNIT/G
1 PACKET (EA) TOPICAL
Status: COMPLETED | OUTPATIENT
Start: 2019-12-23 | End: 2019-12-23

## 2019-12-23 RX ADMIN — LIDOCAINE HYDROCHLORIDE 10 ML: 10 INJECTION, SOLUTION INFILTRATION; PERINEURAL at 15:13

## 2019-12-23 RX ADMIN — TETANUS TOXOID, REDUCED DIPHTHERIA TOXOID AND ACELLULAR PERTUSSIS VACCINE, ADSORBED 0.5 ML: 5; 2.5; 8; 8; 2.5 SUSPENSION INTRAMUSCULAR at 15:13

## 2019-12-23 RX ADMIN — CEPHALEXIN 500 MG: 250 CAPSULE ORAL at 16:23

## 2019-12-23 RX ADMIN — BACITRACIN 1 PACKET: 500 OINTMENT TOPICAL at 16:23

## 2019-12-23 NOTE — ED TRIAGE NOTES
Pt comes in with mom today and mom states that he has been having a cough for a couple of days and last night he began to have a fullness and difficultly hearing out of his left ear.

## 2019-12-23 NOTE — ED NOTES
Pts right thumb was cleaned, bacitracin was applied, a guaze dressing was applied, then a thumb gutter splint was applied.

## 2019-12-23 NOTE — ED PROVIDER NOTES
70-year-old male with history of dementia, GERD, prior concussions, presents to the emergency department by EMS for evaluation of injuries sustained after he tripped on his shoe and fell forward striking his face and right thumb on the concrete below. No LOC nor amnesia of the event, but he did sustain a small abrasion to his nose, and a laceration to the volar aspect of his right thumb. He is uncertain as to when his last tetanus shot was. He denies any other injuries. He has been ambulatory without difficulty after the fall. On no blood thinners. Denies any other numbness, weakness, difficulty speaking, or vision changes. Reportedly the patient's baseline neurologic status is repetitive questioning with mild baseline confusion, reportedly at this neurologic baseline.            Past Medical History:   Diagnosis Date    Confusion     GERD (gastroesophageal reflux disease)     Hematochezia 1/19/2012    Memory loss     Psychiatric disorder     depression       Past Surgical History:   Procedure Laterality Date    HX OTHER SURGICAL      Right inguinal hernia repair    NH COLONOSCOPY FLX DX W/COLLJ SPEC WHEN PFRMD  1/19/2012         NH EGD TRANSORAL BIOPSY SINGLE/MULTIPLE  1/19/2012              Family History:   Problem Relation Age of Onset    Alzheimer Mother        Social History     Socioeconomic History    Marital status: UNKNOWN     Spouse name: Not on file    Number of children: Not on file    Years of education: Not on file    Highest education level: Not on file   Occupational History    Not on file   Social Needs    Financial resource strain: Not on file    Food insecurity:     Worry: Not on file     Inability: Not on file    Transportation needs:     Medical: Not on file     Non-medical: Not on file   Tobacco Use    Smoking status: Never Smoker    Smokeless tobacco: Never Used   Substance and Sexual Activity    Alcohol use: Yes     Comment: socially    Drug use: No    Sexual activity: Not on file   Lifestyle    Physical activity:     Days per week: Not on file     Minutes per session: Not on file    Stress: Not on file   Relationships    Social connections:     Talks on phone: Not on file     Gets together: Not on file     Attends Advent service: Not on file     Active member of club or organization: Not on file     Attends meetings of clubs or organizations: Not on file     Relationship status: Not on file    Intimate partner violence:     Fear of current or ex partner: Not on file     Emotionally abused: Not on file     Physically abused: Not on file     Forced sexual activity: Not on file   Other Topics Concern    Not on file   Social History Narrative    Not on file         ALLERGIES: Sulfa (sulfonamide antibiotics)    Review of Systems   Constitutional: Positive for activity change. Negative for appetite change, chills and fever. HENT: Negative for congestion, facial swelling, rhinorrhea, sinus pain, sneezing and sore throat. Eyes: Negative for photophobia and visual disturbance. Respiratory: Negative for cough and shortness of breath. Cardiovascular: Negative for chest pain. Gastrointestinal: Negative for abdominal pain, blood in stool, constipation, diarrhea, nausea and vomiting. Genitourinary: Negative for difficulty urinating, dysuria, flank pain, hematuria, penile pain and testicular pain. Musculoskeletal: Positive for arthralgias (Right thumb). Negative for back pain, gait problem, joint swelling, myalgias and neck pain. Skin: Positive for wound. Negative for rash. Neurological: Negative for syncope, weakness, light-headedness, numbness and headaches. Psychiatric/Behavioral: Negative for self-injury and suicidal ideas. All other systems reviewed and are negative. Vitals:    12/23/19 1443   BP: 150/63   Pulse: 92   Resp: 18   Temp: 98.6 °F (37 °C)   SpO2: 99%            Physical Exam  Vitals signs and nursing note reviewed. Constitutional:       General: He is not in acute distress. Appearance: He is well-developed. He is not diaphoretic. HENT:      Head: Normocephalic. Comments: No hemo-septum or hemotympanum, oropharynx clear, fluent speech, no midface instability. Nose: Nose normal.   Eyes:      Extraocular Movements: Extraocular movements intact. Conjunctiva/sclera: Conjunctivae normal.      Pupils: Pupils are equal, round, and reactive to light. Comments: No evidence of entrapment. Neck:      Musculoskeletal: Neck supple. Cardiovascular:      Rate and Rhythm: Normal rate and regular rhythm. Heart sounds: Normal heart sounds. Pulmonary:      Effort: Pulmonary effort is normal.      Breath sounds: Normal breath sounds. Abdominal:      General: There is no distension. Palpations: Abdomen is soft. Tenderness: There is no tenderness. Musculoskeletal:         General: No tenderness. Right hand: He exhibits laceration. Hands:    Skin:     General: Skin is warm and dry. Neurological:      General: No focal deficit present. Mental Status: He is alert and oriented to person, place, and time. Mental status is at baseline. GCS: GCS eye subscore is 4. GCS verbal subscore is 5. GCS motor subscore is 6. Cranial Nerves: No cranial nerve deficit. Sensory: No sensory deficit. Coordination: Coordination normal.      Comments: Intact sensation, 5/5 strength in all 4 extremities, intact finger to nose, neg pronator drift, fluent speech, CN intact. Asks repetitive questions with difficulty with short term memory, reportedly chronic          MDM     70-year-old male presents after trip and fall. Thumb wound was cleaned and sutured, as below. X-ray was viewed by myself and read by radiology showing  Acute minimally displaced fracture of the base of the distal phalanx of his right thumb.   He was placed in a thumb spica splint and was reassessed after this was placed found to be NVI. CT head showed no acute intracranial abnormalities. 3:25 PM Case discussed with Dr. Flash Pichardo and DAV Schrader from orthopedics. They agree with suture, splinting and p.o. Keflex which was started in the ED. Recommended close follow-up with Dr. Zuri Jhaveri for further evaluation of his injury. Wound Repair  Date/Time: 12/23/2019 4:00 PM  Performed by: attendingPreparation: skin prepped with Betadine  Location details: right thumb  Wound length:2.5 cm or less  Anesthesia: local infiltration    Anesthesia:  Local Anesthetic: lidocaine 1% without epinephrine  Anesthetic total: 2 mL  Foreign bodies: no foreign bodies  Irrigation solution: saline  Irrigation method: syringe  Debridement: none  Skin closure: 4-0 nylon  Number of sutures: 4  Technique: simple  Approximation: close  Dressing: antibiotic ointment, gauze roll and splint  Patient tolerance: Patient tolerated the procedure well with no immediate complications  My total time at bedside, performing this procedure was 1-15 minutes.

## 2019-12-23 NOTE — ED TRIAGE NOTES
Pt tripped on his shoe in the grocery store causing a GLF, pt has a covered laceration to the right thumb and an abrasion to the bridge of his nose.

## 2020-03-31 ENCOUNTER — OFFICE VISIT (OUTPATIENT)
Dept: NEUROLOGY | Age: 70
End: 2020-03-31

## 2020-03-31 VITALS
BODY MASS INDEX: 22.12 KG/M2 | TEMPERATURE: 97.6 F | HEART RATE: 63 BPM | RESPIRATION RATE: 20 BRPM | SYSTOLIC BLOOD PRESSURE: 124 MMHG | DIASTOLIC BLOOD PRESSURE: 78 MMHG | WEIGHT: 158 LBS | HEIGHT: 71 IN | OXYGEN SATURATION: 97 %

## 2020-03-31 DIAGNOSIS — G30.1 LATE ONSET ALZHEIMER'S DISEASE WITH BEHAVIORAL DISTURBANCE (HCC): Primary | ICD-10-CM

## 2020-03-31 DIAGNOSIS — F02.818 LATE ONSET ALZHEIMER'S DISEASE WITH BEHAVIORAL DISTURBANCE (HCC): Primary | ICD-10-CM

## 2020-03-31 NOTE — PROGRESS NOTES
Pt's brother states nothing new with pt, other than pt broke his right thumb about 3 months ago. Pt is here for F/U alzheimers, which is progressing, brother states.
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
